# Patient Record
Sex: FEMALE | Race: WHITE | NOT HISPANIC OR LATINO | URBAN - METROPOLITAN AREA
[De-identification: names, ages, dates, MRNs, and addresses within clinical notes are randomized per-mention and may not be internally consistent; named-entity substitution may affect disease eponyms.]

---

## 2024-10-03 ENCOUNTER — OFFICE (OUTPATIENT)
Dept: URBAN - METROPOLITAN AREA CLINIC 13 | Facility: CLINIC | Age: 69
End: 2024-10-03
Payer: COMMERCIAL

## 2024-10-03 VITALS
HEART RATE: 78 BPM | OXYGEN SATURATION: 98 % | SYSTOLIC BLOOD PRESSURE: 118 MMHG | WEIGHT: 183 LBS | DIASTOLIC BLOOD PRESSURE: 82 MMHG | HEIGHT: 61 IN

## 2024-10-03 DIAGNOSIS — R11.2 NAUSEA WITH VOMITING, UNSPECIFIED: ICD-10-CM

## 2024-10-03 DIAGNOSIS — R19.7 DIARRHEA, UNSPECIFIED: ICD-10-CM

## 2024-10-03 PROCEDURE — 99204 OFFICE O/P NEW MOD 45 MIN: CPT | Performed by: INTERNAL MEDICINE

## 2024-10-03 RX ORDER — COLESTIPOL HYDROCHLORIDE 1 G/1
TABLET ORAL
Qty: 30 | Refills: 6 | Status: ACTIVE
Start: 2024-10-03

## 2024-11-19 PROBLEM — Z48.815 ENCOUNTER FOR SURGICAL AFTERCARE FOLLOWING SURGERY ON THE DI: Status: ACTIVE | Noted: 2024-11-19

## 2024-12-17 ENCOUNTER — OFFICE (OUTPATIENT)
Dept: URBAN - METROPOLITAN AREA CLINIC 13 | Facility: CLINIC | Age: 69
End: 2024-12-17
Payer: COMMERCIAL

## 2024-12-17 VITALS
DIASTOLIC BLOOD PRESSURE: 80 MMHG | HEIGHT: 61 IN | WEIGHT: 185 LBS | SYSTOLIC BLOOD PRESSURE: 124 MMHG | HEART RATE: 74 BPM

## 2024-12-17 DIAGNOSIS — R19.7 DIARRHEA, UNSPECIFIED: ICD-10-CM

## 2024-12-17 DIAGNOSIS — R11.2 NAUSEA WITH VOMITING, UNSPECIFIED: ICD-10-CM

## 2024-12-17 PROCEDURE — 99215 OFFICE O/P EST HI 40 MIN: CPT | Performed by: INTERNAL MEDICINE

## 2025-01-09 ENCOUNTER — OFFICE (OUTPATIENT)
Dept: URBAN - METROPOLITAN AREA CLINIC 13 | Facility: CLINIC | Age: 70
End: 2025-01-09
Payer: COMMERCIAL

## 2025-01-09 VITALS
HEIGHT: 61 IN | WEIGHT: 181 LBS | DIASTOLIC BLOOD PRESSURE: 80 MMHG | SYSTOLIC BLOOD PRESSURE: 120 MMHG | HEART RATE: 67 BPM

## 2025-01-09 DIAGNOSIS — R11.2 NAUSEA WITH VOMITING, UNSPECIFIED: ICD-10-CM

## 2025-01-09 DIAGNOSIS — R19.7 DIARRHEA, UNSPECIFIED: ICD-10-CM

## 2025-01-09 PROCEDURE — 99214 OFFICE O/P EST MOD 30 MIN: CPT | Performed by: INTERNAL MEDICINE

## 2025-01-27 ENCOUNTER — OFFICE (OUTPATIENT)
Dept: URBAN - METROPOLITAN AREA PATHOLOGY 1 | Facility: PATHOLOGY | Age: 70
End: 2025-01-27
Payer: COMMERCIAL

## 2025-01-27 ENCOUNTER — AMBULATORY SURGICAL CENTER (OUTPATIENT)
Dept: URBAN - METROPOLITAN AREA SURGERY 4 | Facility: SURGERY | Age: 70
End: 2025-01-27
Payer: COMMERCIAL

## 2025-01-27 VITALS
OXYGEN SATURATION: 98 % | SYSTOLIC BLOOD PRESSURE: 112 MMHG | SYSTOLIC BLOOD PRESSURE: 138 MMHG | RESPIRATION RATE: 18 BRPM | DIASTOLIC BLOOD PRESSURE: 76 MMHG | RESPIRATION RATE: 25 BRPM | RESPIRATION RATE: 7 BRPM | HEIGHT: 61 IN | SYSTOLIC BLOOD PRESSURE: 134 MMHG | RESPIRATION RATE: 11 BRPM | HEART RATE: 95 BPM | OXYGEN SATURATION: 94 % | DIASTOLIC BLOOD PRESSURE: 96 MMHG | HEART RATE: 79 BPM | DIASTOLIC BLOOD PRESSURE: 79 MMHG | OXYGEN SATURATION: 99 % | RESPIRATION RATE: 26 BRPM | TEMPERATURE: 98 F | RESPIRATION RATE: 15 BRPM | RESPIRATION RATE: 16 BRPM | HEART RATE: 78 BPM | SYSTOLIC BLOOD PRESSURE: 120 MMHG | SYSTOLIC BLOOD PRESSURE: 126 MMHG | DIASTOLIC BLOOD PRESSURE: 83 MMHG | OXYGEN SATURATION: 95 % | DIASTOLIC BLOOD PRESSURE: 72 MMHG | SYSTOLIC BLOOD PRESSURE: 148 MMHG | OXYGEN SATURATION: 97 % | DIASTOLIC BLOOD PRESSURE: 73 MMHG | DIASTOLIC BLOOD PRESSURE: 104 MMHG | OXYGEN SATURATION: 100 % | DIASTOLIC BLOOD PRESSURE: 75 MMHG | SYSTOLIC BLOOD PRESSURE: 135 MMHG | HEART RATE: 84 BPM | HEART RATE: 91 BPM | SYSTOLIC BLOOD PRESSURE: 149 MMHG | SYSTOLIC BLOOD PRESSURE: 123 MMHG | DIASTOLIC BLOOD PRESSURE: 60 MMHG | WEIGHT: 180 LBS | HEART RATE: 94 BPM | DIASTOLIC BLOOD PRESSURE: 99 MMHG | SYSTOLIC BLOOD PRESSURE: 121 MMHG | HEART RATE: 93 BPM | HEART RATE: 76 BPM | RESPIRATION RATE: 22 BRPM | DIASTOLIC BLOOD PRESSURE: 110 MMHG | HEART RATE: 90 BPM | SYSTOLIC BLOOD PRESSURE: 130 MMHG | DIASTOLIC BLOOD PRESSURE: 86 MMHG | HEART RATE: 81 BPM | HEART RATE: 77 BPM | SYSTOLIC BLOOD PRESSURE: 146 MMHG

## 2025-01-27 DIAGNOSIS — Z86.0100 PERSONAL HISTORY OF COLON POLYPS, UNSPECIFIED: ICD-10-CM

## 2025-01-27 DIAGNOSIS — D12.0 BENIGN NEOPLASM OF CECUM: ICD-10-CM

## 2025-01-27 DIAGNOSIS — R11.2 NAUSEA WITH VOMITING, UNSPECIFIED: ICD-10-CM

## 2025-01-27 DIAGNOSIS — Z98.84 BARIATRIC SURGERY STATUS: ICD-10-CM

## 2025-01-27 DIAGNOSIS — D17.79 BENIGN LIPOMATOUS NEOPLASM OF OTHER SITES: ICD-10-CM

## 2025-01-27 DIAGNOSIS — R19.7 DIARRHEA, UNSPECIFIED: ICD-10-CM

## 2025-01-27 DIAGNOSIS — K31.9 DISEASE OF STOMACH AND DUODENUM, UNSPECIFIED: ICD-10-CM

## 2025-01-27 PROBLEM — Z86.010 PERSONAL HISTORY OF COLON POLYPS: Status: ACTIVE | Noted: 2025-01-27

## 2025-01-27 PROBLEM — K63.5 POLYP OF COLON: Status: ACTIVE | Noted: 2025-01-27

## 2025-01-27 PROCEDURE — 43239 EGD BIOPSY SINGLE/MULTIPLE: CPT | Performed by: INTERNAL MEDICINE

## 2025-01-27 PROCEDURE — 88342 IMHCHEM/IMCYTCHM 1ST ANTB: CPT | Performed by: PATHOLOGY

## 2025-01-27 PROCEDURE — 45385 COLONOSCOPY W/LESION REMOVAL: CPT | Performed by: INTERNAL MEDICINE

## 2025-01-27 PROCEDURE — 88305 TISSUE EXAM BY PATHOLOGIST: CPT | Performed by: PATHOLOGY

## 2025-01-27 PROCEDURE — 45380 COLONOSCOPY AND BIOPSY: CPT | Mod: 59 | Performed by: INTERNAL MEDICINE

## 2025-01-27 NOTE — SERVICEHPINOTES
This patient has a personal history of adenomatous colon polyps and is now seen for colorectal surveillance/screening.  The patient presents for colonoscopic evaluation of    diarrhea  .   The patient is seen for EGD evaluation of   nausea/vomiting and diarrhea  . A tubular adenoma was removed from her colon in 2022. She is status post Jose-en-Y gastric bypass. She is also on Mounjaro.

## 2025-01-30 LAB — PDF: PDF REPORT: (no result)

## 2025-02-27 ENCOUNTER — OFFICE (OUTPATIENT)
Dept: URBAN - METROPOLITAN AREA CLINIC 13 | Facility: CLINIC | Age: 70
End: 2025-02-27
Payer: MEDICAID

## 2025-02-27 VITALS
HEIGHT: 61 IN | WEIGHT: 188 LBS | SYSTOLIC BLOOD PRESSURE: 138 MMHG | DIASTOLIC BLOOD PRESSURE: 88 MMHG | HEART RATE: 95 BPM

## 2025-02-27 DIAGNOSIS — Z86.0101 PERSONAL HISTORY OF ADENOMATOUS AND SERRATED COLON POLYPS: ICD-10-CM

## 2025-02-27 DIAGNOSIS — R19.7 DIARRHEA, UNSPECIFIED: ICD-10-CM

## 2025-02-27 DIAGNOSIS — R11.2 NAUSEA WITH VOMITING, UNSPECIFIED: ICD-10-CM

## 2025-02-27 PROBLEM — Z86.010 PERSONAL HISTORY OF COLON POLYPS: Status: ACTIVE | Noted: 2025-01-27

## 2025-02-27 PROCEDURE — 99213 OFFICE O/P EST LOW 20 MIN: CPT | Performed by: INTERNAL MEDICINE
